# Patient Record
Sex: FEMALE | Race: AMERICAN INDIAN OR ALASKA NATIVE
[De-identification: names, ages, dates, MRNs, and addresses within clinical notes are randomized per-mention and may not be internally consistent; named-entity substitution may affect disease eponyms.]

---

## 2020-01-29 ENCOUNTER — HOSPITAL ENCOUNTER (OUTPATIENT)
Dept: HOSPITAL 5 - SPVWC | Age: 65
Discharge: HOME | End: 2020-01-29
Attending: INTERNAL MEDICINE
Payer: COMMERCIAL

## 2020-01-29 DIAGNOSIS — Z12.31: Primary | ICD-10-CM

## 2020-01-29 DIAGNOSIS — N64.89: ICD-10-CM

## 2020-01-29 PROCEDURE — 77067 SCR MAMMO BI INCL CAD: CPT

## 2020-01-29 NOTE — MAMMOGRAPHY REPORT
BILATERAL DIGITAL SCREENING MAMMOGRAM WITH CAD 

 

HISTORY: SCREENING MAMMOGRAM



TECHNIQUE:  Routine digital mammographic imaging performed.  This examination was interpreted with olive medrano benefit of Computer-aided Detection analysis.



COMPARISON: None currently available, patient reports prior mammograms at [facility] i
n these will be requested for review.



FINDINGS: 



Breast Density: scattered fibroglandular appearance of the breast tissue.



Digital CC and MLO views demonstrate asymmetries in the left lateral posterior breast and left medial
 breast at middle depth. These are seen on the cc view only. Postsurgical changes in the right latera
l breast are also noted.





IMPRESSION:



There are two asymmetries in the left breast. Comparison with prior mammograms is recommended. We maite
l attempt to obtain prior mammograms for comparison. An addendum will be added to this report once pr
ior images are received. If we do not obtain a prior mammogram within 30 days, a revised report will 
be issued recommending a recall for additional imaging. Please be advised that the patient should not
 schedule an appointment for return until adequate time (at least 2 weeks) has passed for us to obtai
n the prior mammogram.





BIRADS 0-Incomplete:  Needs additional imaging evaluation





FURTHER INFORMATION:  According to the [employer], yearly mammograms are recommend
ed starting at age 40 and continuing as long as a woman is in good health. Clinical Breast Exams shou
ld be part of a periodic health exam-about every 3 years for women in their 20s and 30s and every yea
r for women 40 and over. Breast self exam is an option for women starting in their 20s. Any breast ch
luis a noted on a breast self exam should be reported promptly to the patient's healthcare provider. Br
east MRI is recommended for women with an approximately 20-25% or greater lifetime risk of breast can
cer, including women with a strong family history of breast or ovarian cancer and women who have been
 treated for Hodgkin's disease.



A negative Mammography report should not discourage follow up or biopsy of a clinically significant f
inding and/or abnormality.



Dense breast tissue may obscure small neoplasms.  



The patient will be entered into a reminder system with a target due date for the next screening mamm
ogram.

 



Signer Name: Davi Lim MD 

Signed: 1/29/2020 10:12 AM

 Workstation Name: [id number]